# Patient Record
Sex: MALE | Race: WHITE | NOT HISPANIC OR LATINO | Employment: FULL TIME | URBAN - METROPOLITAN AREA
[De-identification: names, ages, dates, MRNs, and addresses within clinical notes are randomized per-mention and may not be internally consistent; named-entity substitution may affect disease eponyms.]

---

## 2023-12-13 ENCOUNTER — OFFICE VISIT (OUTPATIENT)
Dept: URGENT CARE | Facility: CLINIC | Age: 53
End: 2023-12-13
Payer: COMMERCIAL

## 2023-12-13 VITALS
SYSTOLIC BLOOD PRESSURE: 114 MMHG | RESPIRATION RATE: 18 BRPM | TEMPERATURE: 97.8 F | WEIGHT: 190 LBS | HEART RATE: 71 BPM | OXYGEN SATURATION: 98 % | DIASTOLIC BLOOD PRESSURE: 72 MMHG

## 2023-12-13 DIAGNOSIS — Z76.89 REFERRAL OF PATIENT: ICD-10-CM

## 2023-12-13 DIAGNOSIS — M54.50 ACUTE BILATERAL LOW BACK PAIN, UNSPECIFIED WHETHER SCIATICA PRESENT: ICD-10-CM

## 2023-12-13 DIAGNOSIS — R19.00 REDUCIBLE BULGE OF ABDOMINAL WALL: ICD-10-CM

## 2023-12-13 PROCEDURE — 3078F DIAST BP <80 MM HG: CPT | Performed by: STUDENT IN AN ORGANIZED HEALTH CARE EDUCATION/TRAINING PROGRAM

## 2023-12-13 PROCEDURE — 99204 OFFICE O/P NEW MOD 45 MIN: CPT | Performed by: STUDENT IN AN ORGANIZED HEALTH CARE EDUCATION/TRAINING PROGRAM

## 2023-12-13 PROCEDURE — 3074F SYST BP LT 130 MM HG: CPT | Performed by: STUDENT IN AN ORGANIZED HEALTH CARE EDUCATION/TRAINING PROGRAM

## 2023-12-13 RX ORDER — KETOROLAC TROMETHAMINE 30 MG/ML
30 INJECTION, SOLUTION INTRAMUSCULAR; INTRAVENOUS ONCE
Status: COMPLETED | OUTPATIENT
Start: 2023-12-13 | End: 2023-12-13

## 2023-12-13 RX ORDER — LIDOCAINE 50 MG/G
1 PATCH TOPICAL EVERY 24 HOURS
Qty: 10 PATCH | Refills: 0 | Status: SHIPPED | OUTPATIENT
Start: 2023-12-13 | End: 2024-01-29

## 2023-12-13 RX ADMIN — KETOROLAC TROMETHAMINE 30 MG: 30 INJECTION, SOLUTION INTRAMUSCULAR; INTRAVENOUS at 13:09

## 2023-12-13 NOTE — PROGRESS NOTES
Subjective     Samuel Mcgowan is a 53 y.o. male who presents with Back Injury (Lower back injury when lifting weights, x last Saturday. Concerned he also has a hernia )            Samuel is a 53 y.o. male who presents with bilateral lower back pain.  Patient states he injured lower back while lifting weights over the weekend. Patient believed he hurt it while doing deadlifts.  Patient has been resting back applying heat and taking OTC pain reliever for symptoms which has helped.  Patient states the other day he was trying to tighten his core to support his back and noticed a bulge in his abdomen.  Patient states he pushed and bulging portion of diarrhea..  Patient reports no abdominal pain.  No bowel/bladder incontinence.  No radiation of back pain or numbness/tingling.  Patient is concerned for hernia ever since he saw the bulge.  Reports daily BMs.    Back Pain  This is a new problem. The current episode started in the past 7 days. The problem is unchanged. The pain is present in the lumbar spine. The pain does not radiate. Pertinent negatives include no abdominal pain, bladder incontinence, bowel incontinence, chest pain, dysuria, fever, headaches, leg pain, numbness, paresis, paresthesias, pelvic pain, perianal numbness, tingling or weakness. He has tried heat and analgesics for the symptoms. The treatment provided mild relief.       Review of Systems   Constitutional:  Negative for fever.   Cardiovascular:  Negative for chest pain.   Gastrointestinal:  Negative for abdominal pain and bowel incontinence.   Genitourinary:  Negative for bladder incontinence, dysuria and pelvic pain.   Musculoskeletal:  Positive for back pain.   Neurological:  Negative for tingling, weakness, numbness, headaches and paresthesias.              Objective     /72   Pulse 71   Temp 36.6 °C (97.8 °F)   Resp 18   SpO2 98%      Physical Exam  Constitutional:       Appearance: Normal appearance.   HENT:      Head:  Normocephalic and atraumatic.      Nose: Nose normal.      Mouth/Throat:      Mouth: Mucous membranes are moist.      Pharynx: Oropharynx is clear.   Eyes:      Extraocular Movements: Extraocular movements intact.      Conjunctiva/sclera: Conjunctivae normal.      Pupils: Pupils are equal, round, and reactive to light.   Cardiovascular:      Rate and Rhythm: Normal rate.   Pulmonary:      Effort: Pulmonary effort is normal.   Abdominal:      General: Abdomen is flat. Bowel sounds are normal. There is no distension.      Palpations: Abdomen is soft.      Tenderness: There is no abdominal tenderness. There is no guarding or rebound.      Hernia: No hernia is present.   Musculoskeletal:      Cervical back: Normal.      Thoracic back: Normal.      Lumbar back: Tenderness present. No swelling, deformity, spasms or bony tenderness. Normal range of motion.        Back:    Skin:     General: Skin is warm and dry.   Neurological:      General: No focal deficit present.      Mental Status: He is alert. Mental status is at baseline.      Gait: Gait is intact.                             Assessment & Plan        1. Acute bilateral low back pain, unspecified whether sciatica present  - ketorolac (Toradol) injection 30 mg  - diclofenac sodium (VOLTAREN) 1 % Gel; Apply 2 g topically 2 times a day as needed (pain) for up to 14 days.  Dispense: 100 g; Refill: 0  - lidocaine (LIDODERM) 5 % Patch; Place 1 Patch on the skin every 24 hours.  Dispense: 10 Patch; Refill: 0    2. Reducible bulge of abdominal wall  - Undiagnosed new problem.  - No hernia appreciated on physical exam. Patient reports abdominal bulge which he pressed in. Normal daily BMs. No abdominal pain.  - US-HERNIA ABDOMEN; Future  -Scheduled as outpatient.  Will contact patient with results once ultrasound completed.  Ultrasound results will also be available via [employer].    US-HERNIA ABDOMEN:  FINDINGS:Focus ultrasound of the area of the in the right abdominal wall  demonstrates no hernia, mass or fluid collection.  IMPRESSION: No abnormalities detected.    Differential diagnoses, supportive care measures and indications for immediate follow-up discussed with patient. Pathogenesis of diagnosis discussed including typical length and natural progression.  Follow up with PCP. ER precautions discussed.    Instructed to return to urgent care or nearest emergency department if symptoms fail to improve, for any change in condition, further concerns, or new concerning symptoms.    Patient states understanding and agrees with the plan of care and discharge instructions.

## 2023-12-14 ENCOUNTER — HOSPITAL ENCOUNTER (OUTPATIENT)
Dept: RADIOLOGY | Facility: MEDICAL CENTER | Age: 53
End: 2023-12-14
Attending: STUDENT IN AN ORGANIZED HEALTH CARE EDUCATION/TRAINING PROGRAM
Payer: COMMERCIAL

## 2023-12-14 ENCOUNTER — TELEPHONE (OUTPATIENT)
Dept: URGENT CARE | Facility: PHYSICIAN GROUP | Age: 53
End: 2023-12-14

## 2023-12-14 DIAGNOSIS — R19.00 REDUCIBLE BULGE OF ABDOMINAL WALL: ICD-10-CM

## 2023-12-14 PROCEDURE — 76705 ECHO EXAM OF ABDOMEN: CPT

## 2023-12-14 ASSESSMENT — ENCOUNTER SYMPTOMS
PARESTHESIAS: 0
ABDOMINAL PAIN: 0
TINGLING: 0
HEADACHES: 0
PERIANAL NUMBNESS: 0
BOWEL INCONTINENCE: 0
PARESIS: 0
FEVER: 0
WEAKNESS: 0
NUMBNESS: 0
BACK PAIN: 1
LEG PAIN: 0

## 2023-12-15 NOTE — TELEPHONE ENCOUNTER
"Phone number on file states \"not in service.\"  Calling in regards to ultrasound result.    US-HERNIA ABDOMEN   FINDINGS: Focus ultrasound of the area of the in the right abdominal wall demonstrates no hernia, mass or fluid collection.  IMPRESSION: No abnormalities detected.    Follow up with PCP.    Return to urgent care or nearest emergency department if symptoms fail to improve, for any change in condition, further concerns, or new concerning symptoms.    "

## 2023-12-17 ENCOUNTER — HOSPITAL ENCOUNTER (EMERGENCY)
Facility: MEDICAL CENTER | Age: 53
End: 2023-12-17
Attending: EMERGENCY MEDICINE
Payer: COMMERCIAL

## 2023-12-17 VITALS
RESPIRATION RATE: 16 BRPM | TEMPERATURE: 98 F | HEART RATE: 50 BPM | BODY MASS INDEX: 25.39 KG/M2 | DIASTOLIC BLOOD PRESSURE: 65 MMHG | WEIGHT: 191.58 LBS | SYSTOLIC BLOOD PRESSURE: 114 MMHG | OXYGEN SATURATION: 96 % | HEIGHT: 73 IN

## 2023-12-17 DIAGNOSIS — R51.9 ACUTE NONINTRACTABLE HEADACHE, UNSPECIFIED HEADACHE TYPE: ICD-10-CM

## 2023-12-17 LAB
ANION GAP SERPL CALC-SCNC: 11 MMOL/L (ref 7–16)
BASOPHILS # BLD AUTO: 0.6 % (ref 0–1.8)
BASOPHILS # BLD: 0.04 K/UL (ref 0–0.12)
BUN SERPL-MCNC: 11 MG/DL (ref 8–22)
CALCIUM SERPL-MCNC: 9.4 MG/DL (ref 8.5–10.5)
CHLORIDE SERPL-SCNC: 99 MMOL/L (ref 96–112)
CO2 SERPL-SCNC: 26 MMOL/L (ref 20–33)
CREAT SERPL-MCNC: 1.15 MG/DL (ref 0.5–1.4)
EOSINOPHIL # BLD AUTO: 0.11 K/UL (ref 0–0.51)
EOSINOPHIL NFR BLD: 1.6 % (ref 0–6.9)
ERYTHROCYTE [DISTWIDTH] IN BLOOD BY AUTOMATED COUNT: 38.7 FL (ref 35.9–50)
GFR SERPLBLD CREATININE-BSD FMLA CKD-EPI: 75 ML/MIN/1.73 M 2
GLUCOSE SERPL-MCNC: 92 MG/DL (ref 65–99)
HCT VFR BLD AUTO: 45.7 % (ref 42–52)
HGB BLD-MCNC: 15.7 G/DL (ref 14–18)
IMM GRANULOCYTES # BLD AUTO: 0.02 K/UL (ref 0–0.11)
IMM GRANULOCYTES NFR BLD AUTO: 0.3 % (ref 0–0.9)
LYMPHOCYTES # BLD AUTO: 1.46 K/UL (ref 1–4.8)
LYMPHOCYTES NFR BLD: 21 % (ref 22–41)
MCH RBC QN AUTO: 30.7 PG (ref 27–33)
MCHC RBC AUTO-ENTMCNC: 34.4 G/DL (ref 32.3–36.5)
MCV RBC AUTO: 89.4 FL (ref 81.4–97.8)
MONOCYTES # BLD AUTO: 0.61 K/UL (ref 0–0.85)
MONOCYTES NFR BLD AUTO: 8.8 % (ref 0–13.4)
NEUTROPHILS # BLD AUTO: 4.7 K/UL (ref 1.82–7.42)
NEUTROPHILS NFR BLD: 67.7 % (ref 44–72)
NRBC # BLD AUTO: 0 K/UL
NRBC BLD-RTO: 0 /100 WBC (ref 0–0.2)
PLATELET # BLD AUTO: 231 K/UL (ref 164–446)
PMV BLD AUTO: 9.7 FL (ref 9–12.9)
POTASSIUM SERPL-SCNC: 4 MMOL/L (ref 3.6–5.5)
RBC # BLD AUTO: 5.11 M/UL (ref 4.7–6.1)
SODIUM SERPL-SCNC: 136 MMOL/L (ref 135–145)
WBC # BLD AUTO: 6.9 K/UL (ref 4.8–10.8)

## 2023-12-17 PROCEDURE — 96375 TX/PRO/DX INJ NEW DRUG ADDON: CPT

## 2023-12-17 PROCEDURE — 96374 THER/PROPH/DIAG INJ IV PUSH: CPT

## 2023-12-17 PROCEDURE — 700111 HCHG RX REV CODE 636 W/ 250 OVERRIDE (IP): Performed by: EMERGENCY MEDICINE

## 2023-12-17 PROCEDURE — 85025 COMPLETE CBC W/AUTO DIFF WBC: CPT

## 2023-12-17 PROCEDURE — 99284 EMERGENCY DEPT VISIT MOD MDM: CPT

## 2023-12-17 PROCEDURE — 36415 COLL VENOUS BLD VENIPUNCTURE: CPT

## 2023-12-17 PROCEDURE — 80048 BASIC METABOLIC PNL TOTAL CA: CPT

## 2023-12-17 RX ORDER — DEXAMETHASONE SODIUM PHOSPHATE 4 MG/ML
10 INJECTION, SOLUTION INTRA-ARTICULAR; INTRALESIONAL; INTRAMUSCULAR; INTRAVENOUS; SOFT TISSUE ONCE
Status: COMPLETED | OUTPATIENT
Start: 2023-12-17 | End: 2023-12-17

## 2023-12-17 RX ORDER — KETOROLAC TROMETHAMINE 30 MG/ML
15 INJECTION, SOLUTION INTRAMUSCULAR; INTRAVENOUS ONCE
Status: COMPLETED | OUTPATIENT
Start: 2023-12-17 | End: 2023-12-17

## 2023-12-17 RX ORDER — DIPHENHYDRAMINE HYDROCHLORIDE 50 MG/ML
25 INJECTION INTRAMUSCULAR; INTRAVENOUS ONCE
Status: COMPLETED | OUTPATIENT
Start: 2023-12-17 | End: 2023-12-17

## 2023-12-17 RX ORDER — METOCLOPRAMIDE HYDROCHLORIDE 5 MG/ML
10 INJECTION INTRAMUSCULAR; INTRAVENOUS ONCE
Status: COMPLETED | OUTPATIENT
Start: 2023-12-17 | End: 2023-12-17

## 2023-12-17 RX ADMIN — DEXAMETHASONE SODIUM PHOSPHATE 10 MG: 4 INJECTION INTRA-ARTICULAR; INTRALESIONAL; INTRAMUSCULAR; INTRAVENOUS; SOFT TISSUE at 20:27

## 2023-12-17 RX ADMIN — METOCLOPRAMIDE 10 MG: 5 INJECTION, SOLUTION INTRAMUSCULAR; INTRAVENOUS at 20:23

## 2023-12-17 RX ADMIN — DIPHENHYDRAMINE HYDROCHLORIDE 25 MG: 50 INJECTION, SOLUTION INTRAMUSCULAR; INTRAVENOUS at 20:23

## 2023-12-17 RX ADMIN — KETOROLAC TROMETHAMINE 15 MG: 30 INJECTION, SOLUTION INTRAMUSCULAR; INTRAVENOUS at 20:22

## 2023-12-17 ASSESSMENT — PAIN DESCRIPTION - PAIN TYPE: TYPE: ACUTE PAIN

## 2023-12-18 NOTE — DISCHARGE INSTRUCTIONS
You were seen in the Emergency Department for headache.    Labs were completed without significant acute abnormalities.    Please use 1,000mg of tylenol or 600mg of ibuprofen every 6 hours as needed for pain.    Please follow up with your primary care physician.    Return to the Emergency Department with severe headaches, confusion, new neurologic deficits, or other concerns.

## 2023-12-18 NOTE — ED NOTES
"Pt discharged to home with steady gait.  Pt alert and oriented times 4 on room air.  IV discontinued and gauze placed, pt in possession of belongings.  Pt provided discharge education and information pertaining to medications and follow up appointments.  Pt received copy of discharge instructions and verbalized understanding. Encouraged to follow up with PCP. /65   Pulse (!) 50   Temp 36.7 °C (98 °F) (Temporal)   Resp 16   Ht 1.854 m (6' 1\")   Wt 86.9 kg (191 lb 9.3 oz)   SpO2 96%   BMI 25.28 kg/m²      "

## 2023-12-18 NOTE — ED PROVIDER NOTES
"ED Provider Note    CHIEF COMPLAINT  Chief Complaint   Patient presents with    Migraine     Patient reports 30 hours. Patient reports light sensitive, vomiting, distorted vision \" black spots\". Patient reports hx of migraine but has not had one in 10 years. Patient reports taking 2 Advil unknown strength 1-2 hours ago.      N/V     EXTERNAL RECORDS REVIEWED  Patient was seen at Urgent Care on 12/13 for back pain. He was last admitted to outside hospital in 2021 for chest pain.     HPI/ROS  LIMITATION TO HISTORY   None  OUTSIDE HISTORIAN(S):  Significant other Wife is at bedside.    Samuel Mcgowan is a 53 y.o. male who presents to the Emergency Department with headache, nausea, and vomiting onset 2 days ago. Wife states associated symptoms of vision changes, nausea, vomiting, and headache. He describes his vision changes as prism like changes which he often times gets without headaches as well. He describes his headache is was on one side yesterday and moved to the other side that worsens in well lit areas.  No recent illness or fevers.  No recent head trauma. He states a history of migraines but has not had one in many years.  He reports he took 2 Advils 1-2 hours ago with minimal relief. He states he has not seen a neurologist in years, because he was not experiencing migraines anymore. He notes he is allergic to phenergan which causes him to become very agitated and \"freak out.\" He adds he is overall healthy and does not take any daily medications.    PAST MEDICAL HISTORY  History reviewed. No pertinent past medical history.     SURGICAL HISTORY  History reviewed. No pertinent surgical history.     FAMILY HISTORY  History reviewed. No pertinent family history.    SOCIAL HISTORY   reports that he has never smoked. He has never used smokeless tobacco. He reports current alcohol use. He reports current drug use. Drugs: Inhaled and Oral.    CURRENT MEDICATIONS  Discharge Medication List as of 12/17/2023 10:07 " "PM        CONTINUE these medications which have NOT CHANGED    Details   diclofenac sodium (VOLTAREN) 1 % Gel Apply 2 g topically 2 times a day as needed (pain) for up to 14 days., Disp-100 g, R-0, Normal      lidocaine (LIDODERM) 5 % Patch Place 1 Patch on the skin every 24 hours., Disp-10 Patch, R-0, Normal             ALLERGIES  Promethazine and Penicillins    PHYSICAL EXAM  /74   Pulse (!) 54   Temp 36.8 °C (98.2 °F) (Temporal)   Resp 16   Ht 1.854 m (6' 1\")   Wt 86.9 kg (191 lb 9.3 oz)   SpO2 100%      Constitutional: Nontoxic appearing. Alert in no apparent distress.  HENT: Normocephalic, Atraumatic. Bilateral external ears normal. Nose normal.  Moist mucous membranes.  Oropharynx clear.  Eyes: Pupils are equal and reactive. Conjunctiva normal.   Neck: Supple, full range of motion  Heart: Regular rate and rhythm.  No murmurs.    Lungs: No respiratory distress, normal work of breathing. Lungs clear to auscultation bilaterally.  Abdomen Soft, no distention.  No tenderness to palpation.  Musculoskeletal: Atraumatic. No obvious deformities noted.  No lower extremity edema.  Skin: Warm, Dry.  No erythema, No rash.   Neurologic: Alert and oriented x3. Moving all extremities spontaneously without focal deficits. Alert and oriented x3.  Cranial nerves II-XII intact.  Strength 5/5 and sensation intact throughout all 4 extremities.  No pronator drift.  No dysmetria.  Normal speech. Normal gait.   Psychiatric: Affect normal, Mood normal, Appears appropriate and not intoxicated.     DIAGNOSTIC STUDIES / PROCEDURES      LABS  Labs Reviewed   CBC WITH DIFFERENTIAL - Abnormal; Notable for the following components:       Result Value    Lymphocytes 21.00 (*)     All other components within normal limits   BASIC METABOLIC PANEL   ESTIMATED GFR          COURSE & MEDICAL DECISION MAKING  7:50 PM - Patient seen and examined at bedside. Discussed plan of care, including labs and medication to treat his headache. The " patient will be medicated with Toradol injection 15 mg. Ordered for BMP and CBC w/ Diff to evaluate his symptoms. Patient agrees to the plan of care.    ED Observation Status? No; Patient does not meet criteria for ED Observation.     INITIAL ASSESSMENT, COURSE AND PLAN  Care Narrative: Relatively healthy patient with significant migraine history who presents with 2 day history of headache.  He has normal vitals on arrival.  No neurologic deficits on exam to suggest intracranial pathology.  Clinically doubt meningitis or subarachnoid hemorrhage.  Labs are reassuring without leukocytosis, electrolyte abnormality or renal dysfunction.  Will treat symptomatically for likely migraine followed by reassessment.    9:20PM - Upon reassessment, patient is resting comfortably with normal vital signs. No new complaints at this time. Headache significantly improved.  Discussed results with patient and/or family as well as importance of primary care follow up. Patient understands plan of care and strict return precautions for new or changing symptoms.     ADDITIONAL PROBLEM LIST  Problem #1: Acute headache - likely migraine, improved with treatment      DISPOSITION AND DISCUSSIONS  Escalation of care considered, and ultimately not performed:diagnostic imaging    Barriers to care at this time, including but not limited to: Patient does not have established PCP.     Decision tools and prescription drugs considered including, but not limited to: Pain Medications over the counter medications should be sufficient .      DISPOSITION:  Patient will be discharged home in stable condition.    FOLLOW UP:  [facility] - Primary Care  [address]  [facility] [id number] [phone]  Call   to establish primary care physician    [facility], Emergency Dept  [address] [facility] [address] [phone]    If symptoms worsen      OUTPATIENT MEDICATIONS:  Discharge Medication List as of 12/17/2023 10:07  PM             FINAL DIAGNOSIS  1. Acute nonintractable headache, unspecified headache type        The note accurately reflects work and decisions made by me.  Lula Lomas M.D.  12/17/2023  10:47 PM    Monique PENNY (Francisco Jkusum), am scribing for, and in the presence of, Lula Lomas M.D..    Electronically signed by: Monique Zavala (Bebeto), 12/17/2023    Lula PENNY M.D. personally performed the services described in this documentation, as scribed by Monique Zavala in my presence, and it is both accurate and complete.

## 2023-12-18 NOTE — ED TRIAGE NOTES
"Chief Complaint   Patient presents with    Migraine     Patient reports 30 hours. Patient reports light sensitive, vomiting, distorted vision \" black spots\". Patient reports hx of migraine but has not had one in 10 years. Patient reports taking 2 Advil unknown strength 1-2 hours ago.      N/V       "

## 2023-12-18 NOTE — ED NOTES
Pt to G26. Pt is A&Ox4 and awake in bed. Pt placed on pulse ox, and automatic BP. VSS, saturating above 95% on RA, heart rate in the 50s. Call light within reach and chart up for ERP.

## 2023-12-18 NOTE — ED NOTES
PT AMBULATED SELF TO ROOM WITHOUT INCIDENT BY ESCORT OF THIS TECH.     PT PLACED ON MONITOR AND RESTING COMFORTABLY WITH LIGHTS OFF PER REQUEST DUE TO LIGHT SENSITIVITY. PT VISITOR AT BEDSIDE.

## 2023-12-21 ENCOUNTER — TELEPHONE (OUTPATIENT)
Dept: HEALTH INFORMATION MANAGEMENT | Facility: OTHER | Age: 53
End: 2023-12-21
Payer: COMMERCIAL

## 2024-01-13 ENCOUNTER — HOSPITAL ENCOUNTER (OUTPATIENT)
Dept: RADIOLOGY | Facility: MEDICAL CENTER | Age: 54
End: 2024-01-13
Attending: FAMILY MEDICINE
Payer: COMMERCIAL

## 2024-01-13 ENCOUNTER — OFFICE VISIT (OUTPATIENT)
Dept: URGENT CARE | Facility: PHYSICIAN GROUP | Age: 54
End: 2024-01-13
Payer: COMMERCIAL

## 2024-01-13 VITALS
SYSTOLIC BLOOD PRESSURE: 132 MMHG | RESPIRATION RATE: 16 BRPM | HEIGHT: 73 IN | DIASTOLIC BLOOD PRESSURE: 80 MMHG | WEIGHT: 184 LBS | BODY MASS INDEX: 24.39 KG/M2 | TEMPERATURE: 97.4 F | HEART RATE: 61 BPM | OXYGEN SATURATION: 97 %

## 2024-01-13 DIAGNOSIS — S66.912A STRAIN OF BOTH WRISTS, INITIAL ENCOUNTER: ICD-10-CM

## 2024-01-13 DIAGNOSIS — S66.911A STRAIN OF BOTH WRISTS, INITIAL ENCOUNTER: ICD-10-CM

## 2024-01-13 DIAGNOSIS — R51.9 NONINTRACTABLE HEADACHE, UNSPECIFIED CHRONICITY PATTERN, UNSPECIFIED HEADACHE TYPE: ICD-10-CM
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
## 2024-01-13 PROCEDURE — 3075F SYST BP GE 130 - 139MM HG: CPT | Performed by: FAMILY MEDICINE

## 2024-01-13 PROCEDURE — 3079F DIAST BP 80-89 MM HG: CPT | Performed by: FAMILY MEDICINE

## 2024-01-13 PROCEDURE — 73110 X-RAY EXAM OF WRIST: CPT | Mod: LT

## 2024-01-13 PROCEDURE — 99214 OFFICE O/P EST MOD 30 MIN: CPT | Performed by: FAMILY MEDICINE

## 2024-01-13 PROCEDURE — 73110 X-RAY EXAM OF WRIST: CPT | Mod: RT

## 2024-01-13 RX ORDER — DIPHENHYDRAMINE HYDROCHLORIDE 50 MG/ML
25 INJECTION INTRAMUSCULAR; INTRAVENOUS ONCE
Status: SHIPPED | OUTPATIENT
Start: 2024-01-13 | End: 2024-01-16

## 2024-01-13 RX ORDER — BUTALBITAL, ACETAMINOPHEN, CAFFEINE AND CODEINE PHOSPHATE 50; 325; 40; 30 MG/1; MG/1; MG/1; MG/1
1-2 CAPSULE ORAL EVERY 8 HOURS PRN
Qty: 20 CAPSULE | Refills: 0 | Status: SHIPPED | OUTPATIENT
Start: 2024-01-13 | End: 2024-01-20

## 2024-01-13 RX ORDER — KETOROLAC TROMETHAMINE 30 MG/ML
30 INJECTION, SOLUTION INTRAMUSCULAR; INTRAVENOUS ONCE
Status: COMPLETED | OUTPATIENT
Start: 2024-01-13 | End: 2024-01-13

## 2024-01-13 RX ADMIN — KETOROLAC TROMETHAMINE 30 MG: 30 INJECTION, SOLUTION INTRAMUSCULAR; INTRAVENOUS at 15:46

## 2024-01-13 ASSESSMENT — ENCOUNTER SYMPTOMS
HEADACHES: 1
FALLS: 1

## 2024-01-13 NOTE — PROGRESS NOTES
Subjective     Samuel Mcgowan is a 53 y.o. male who presents with Wrist Injury (Today exercising and fell back and landed on his wrists, migraine)    - This is a very pleasant 53 y.o. who has come to the walk-in clinic today for wrist injuries.  A few hours ago was jumping up on some blocks for exercise and the block kicked out from underneath him and he fell backwards onto his wrists.  Since then has had some pain in the wrist areas right more so than the left.  Denies hitting his head or any loss of consciousness.    Also reports history of migraines and they have been acting up in the past month or 2.  On his way here he feels his typical migraine starting which is usually some like spots in his visual field and a little bit of nausea and some light sensitivity and then this typically fades away and a headache develops.  Used to be on Imitrex in the past.  Excellent strong work helps migraines this and is like a combo like Toradol and Benadryl help. Denies focal limb weakness/numbness heaviness           ALLERGIES:  Promethazine and Penicillins     PMH:  History reviewed. No pertinent past medical history.     PSH:  History reviewed. No pertinent surgical history.    MEDS:    Current Outpatient Medications:     lidocaine (LIDODERM) 5 % Patch, Place 1 Patch on the skin every 24 hours. (Patient not taking: Reported on 1/13/2024), Disp: 10 Patch, Rfl: 0    Current Facility-Administered Medications:     diphenhydrAMINE (Benadryl) injection 25 mg, 25 mg, Intramuscular, Once, Vincent Navarro M.D.    ** I have documented what I find to be significant in regards to past medical, social, family and surgical history  in my HPI or under PMH/PSH/ review section, otherwise it is noncontributory **           HPI    Review of Systems   Musculoskeletal:  Positive for falls and joint pain.   Neurological:  Positive for headaches.   All other systems reviewed and are negative.             Objective     /80    "Pulse 61   Temp 36.3 °C (97.4 °F) (Temporal)   Resp 16   Ht 1.854 m (6' 1\")   Wt 83.5 kg (184 lb)   SpO2 97%   BMI 24.28 kg/m²      Physical Exam  Vitals and nursing note reviewed.   Constitutional:       General: He is not in acute distress.     Appearance: Normal appearance. He is well-developed.   HENT:      Head: Normocephalic.   Eyes:      Extraocular Movements: Extraocular movements intact.      Pupils: Pupils are equal, round, and reactive to light.   Cardiovascular:      Heart sounds: Normal heart sounds. No murmur heard.  Pulmonary:      Effort: Pulmonary effort is normal. No respiratory distress.   Musculoskeletal:        Arms:       Cervical back: Normal range of motion and neck supple.      Comments: Wrists: No wounds, discoloration, deformity, edema. Good SROM. Some TTP and pain w/ ROM  R>L. NVI    Neurological:      General: No focal deficit present.      Mental Status: He is alert and oriented to person, place, and time.      Cranial Nerves: No cranial nerve deficit.      Motor: No abnormal muscle tone.   Psychiatric:         Mood and Affect: Mood normal.         Behavior: Behavior normal.                             Assessment & Plan     1. Ground-level fall  DX-WRIST-COMPLETE 3+ RIGHT    DX-WRIST-COMPLETE 3+ LEFT      2. Strain of both wrists, initial encounter  DX-WRIST-COMPLETE 3+ RIGHT    DX-WRIST-COMPLETE 3+ LEFT      3. Headache  ketorolac (Toradol) injection 30 mg    diphenhydrAMINE (Benadryl) injection 25 mg          - Dx, plan & d/c instructions discussed   - Rest/ice  - OTC Motrin and/or Tylenol as needed  - thumb spica splint x 1 week   - E.R. precautions discussed     Follow up with your regular primary care providers office within a week to keep them updated and informed of this visit and for regular routine health maintenance check-ups. ER if not improving in 2-3 days or if feeling/getting worse. (If you do not have a primary care provider and need to schedule one you may call " [facility] at [phone] to do this).    Any realistic side effects of medications that may have been given today reviewed.     Patient left in stable condition     Today's radiology imaging personally reviewed by me today on day of visit and Radiology readings reviewed and discussed w/ patient today.      reviewed if narcotics given     Pertinent prior lab work and/or imaging studies in Epic have been reviewed by me today on day of this visit and taken into account for my treatment and plan today    Pertinent PMH/PSH and/or chronic conditions and medications if any were reviewed today and taken into account for my treatment and plan today    Pertinent prior office visit notes in [facility] have been reviewed by me today on day of this visit.    Please note that this dictation may have been created using voice recognition software, if so I have made every reasonable attempt to correct obvious errors, but I expect that there are errors of grammar and possibly content that I did not discover before finalizing the note.

## 2024-01-22 ENCOUNTER — TELEPHONE (OUTPATIENT)
Dept: HEALTH INFORMATION MANAGEMENT | Facility: OTHER | Age: 54
End: 2024-01-22
Payer: COMMERCIAL

## 2024-01-29 ENCOUNTER — OFFICE VISIT (OUTPATIENT)
Dept: NEUROLOGY | Facility: MEDICAL CENTER | Age: 54
End: 2024-01-29
Attending: PSYCHIATRY & NEUROLOGY
Payer: COMMERCIAL

## 2024-01-29 ENCOUNTER — PHARMACY VISIT (OUTPATIENT)
Dept: PHARMACY | Facility: MEDICAL CENTER | Age: 54
End: 2024-01-29
Payer: COMMERCIAL

## 2024-01-29 VITALS
OXYGEN SATURATION: 100 % | HEIGHT: 73 IN | BODY MASS INDEX: 24.6 KG/M2 | HEART RATE: 60 BPM | SYSTOLIC BLOOD PRESSURE: 116 MMHG | WEIGHT: 185.63 LBS | TEMPERATURE: 98.1 F | DIASTOLIC BLOOD PRESSURE: 76 MMHG

## 2024-01-29 DIAGNOSIS — G43.109 MIGRAINE WITH AURA AND WITHOUT STATUS MIGRAINOSUS, NOT INTRACTABLE: Primary | ICD-10-CM

## 2024-01-29 PROCEDURE — 3074F SYST BP LT 130 MM HG: CPT | Performed by: PSYCHIATRY & NEUROLOGY

## 2024-01-29 PROCEDURE — 3078F DIAST BP <80 MM HG: CPT | Performed by: PSYCHIATRY & NEUROLOGY

## 2024-01-29 PROCEDURE — RXMED WILLOW AMBULATORY MEDICATION CHARGE: Performed by: PSYCHIATRY & NEUROLOGY

## 2024-01-29 PROCEDURE — 99211 OFF/OP EST MAY X REQ PHY/QHP: CPT | Performed by: PSYCHIATRY & NEUROLOGY

## 2024-01-29 PROCEDURE — 99205 OFFICE O/P NEW HI 60 MIN: CPT | Performed by: PSYCHIATRY & NEUROLOGY

## 2024-01-29 RX ORDER — SUMATRIPTAN 20 MG/1
SPRAY NASAL
Qty: 9 EACH | Refills: 4 | Status: SHIPPED | OUTPATIENT
Start: 2024-01-29

## 2024-01-29 RX ORDER — RIMEGEPANT SULFATE 75 MG/75MG
1 TABLET, ORALLY DISINTEGRATING ORAL
Qty: 6 TABLET | Refills: 0 | Status: SHIPPED | OUTPATIENT
Start: 2024-01-29 | End: 2024-02-01

## 2024-01-29 RX ORDER — RIMEGEPANT SULFATE 75 MG/75MG
1 TABLET, ORALLY DISINTEGRATING ORAL
Qty: 15 TABLET | Refills: 5 | Status: SHIPPED | OUTPATIENT
Start: 2024-01-29 | End: 2024-02-01

## 2024-01-29 ASSESSMENT — ENCOUNTER SYMPTOMS
HEARTBURN: 0
HEADACHES: 1
MEMORY LOSS: 0
INSOMNIA: 0

## 2024-01-29 ASSESSMENT — PATIENT HEALTH QUESTIONNAIRE - PHQ9: CLINICAL INTERPRETATION OF PHQ2 SCORE: 0

## 2024-01-30 ENCOUNTER — TELEPHONE (OUTPATIENT)
Dept: NEUROLOGY | Facility: MEDICAL CENTER | Age: 54
End: 2024-01-30
Payer: COMMERCIAL

## 2024-01-30 NOTE — TELEPHONE ENCOUNTER
Prior Authorization for NURTEC 75 MG TBDP (Quantity: 15, Days: 30) has been submitted via Cover My Meds: Key ([id number] - PA Case ID: [id number])    Insurance: [facility] ([employer])    Will follow up in 24-48 business hours.

## 2024-01-30 NOTE — TELEPHONE ENCOUNTER
Received [employer] PA request via [employer]  for NURTEC 75 MG TBDP. (Quantity:15, Day Supply:30)     Insurance: [facility] ([employer])  Member ID:  [id number]  BIN: [id number]  PCN: [id number]  Group: [facility]     Ran Test claim via [place] & medication Rejects stating prior authorization is required.

## 2024-01-30 NOTE — PROGRESS NOTES
"Subjective     Samuel Mcgowan is a 53 y.o. male who presents from the office of Dr. Justin Navarro MD, for consultation, with a history of migraine with aura.     CARMEN Baker is a very pleasant 53-year-old right-handed gentleman who has been suffering from migraine with aura following head trauma several decades ago, but which have remained a recurrent problem for him.    Prodrome: None    Aura: The auras are typically visual in nature, he describes photopsias, kaleidoscopic affect, but since December of last year with his most recent headache attack, he described a \"graying out\" of vision.  Regardless, the symptoms would persist for about 30 minutes before the headache would begin.  For the first time last year, he actually had an aura attack occur without a headache that followed.    Headache: Usually over the right temple, rarely over the left, he describes a throbbing pain, heightened sensitivities, impaired concentration, etc.  As the headache begins, the visual distortions resolved, nausea and vomiting occur but only towards the end of the headache itself.  He describes neck pain or stiffness, allodynia or autonomic symptoms with the headaches.    Duration: Severe pain goes for 1 day, residual pain and malaise for another day before he is back at baseline.  Between the severe attacks he still describes malaise and sensitivities to light.    Onset: Random over the course of the day.    Frequency: He has a headache or headaches that occur in a cluster pattern lasting for about 3-6 weeks.  Between these clusters he is essentially headache free.  During a cluster he can have a severe headache upwards of 4 times in a week.  His last cluster occurred this past December, 2023, prior to that December, 2022, prior to that years prior.    Triggers: No clear triggers have been identified as to the start of a headache cluster.    Workup: Over the years he did undergo MRI imaging of the brain, these always " "proving unremarkable.  I have no reports or images to review.    Treatment: He uses Imitrex nasal spray, but unfortunately he uses this with the onset of the aura, he also inhales the medication rather than leaving it to be absorbed in the anterior nares.  He cannot tolerate NSAIDs because of severe reflux.  He has been on no other prescription medications.    Other than significant GERD and heartburn, migraine with aura, he has no history of CAD, CVA, PVD, malignancy, thyroid disease, tremor, glaucoma, kidney stones, malignancy, diabetes, hypertension, autoimmune disease, psychiatric disease, liver or kidney disease, pulmonary disease, or blood dyscrasia.    There is no surgical history of note.    In the family, his mother and sister both suffer from migraine headache, he knows little of his birth father.  His brother is alive and well.    He rarely drinks alcohol, does not smoke cigarettes.  He is an athlete, and active triathlete especially.  He is an IT software salesman, a former .  He denies any atypical stressors.  He has Imitrex nasal spray to use as needed.    Review of Systems   Constitutional:  Negative for malaise/fatigue.   Gastrointestinal:  Negative for heartburn.   Neurological:  Positive for headaches.   Psychiatric/Behavioral:  Negative for memory loss. The patient does not have insomnia.    All other systems reviewed and are negative.    Objective     /76 (BP Location: Right arm, Patient Position: Sitting, BP Cuff Size: Adult)   Pulse 60   Temp 36.7 °C (98.1 °F) (Temporal)   Ht 1.854 m (6' 1\")   Wt 84.2 kg (185 lb 10 oz)   SpO2 100%   BMI 24.49 kg/m²      Physical Exam    He appears in no acute distress.  Vital signs are stable.  There is no malar rash, jaw or temporal tenderness, jaw claudication, or allodynia.  His neck is supple, range of motion is full, carotid pulses are present bilaterally without asymmetry or bruits.  Cardiac evaluation reveals a regular rhythm.  " There is no evidence of diffuse rash, bruising, there is no lower extremity edema.     Neurological Exam    Fully oriented, there is no aphasia, agnosia, apraxia, or inattention.    PERRLA/EOMI, visual fields are full to finger counting on confrontation bilaterally.  Funduscopic exam reveals sharp disc margins bilaterally.  Facial movements are symmetric, sensory exam is intact to temperature and light touch bilaterally.  Tongue and uvula midline, there is no bulbar dysfunction.  Jaw movements are intact, jaw jerk is absent.  Shoulder shrug and head rotation are intact.    Musculoskeletal exam reveals normal tone bilaterally, there is no tremor, asterixis, or drift.  Strength is 5/5 throughout.  Reflexes are diffusely hypoactive but present at all points without asymmetries, ankle jerks require distraction in order to be elicited.  The toes are downgoing bilaterally.    He stands easily, gait is normal and station and stride length, armswing is symmetric.  Heel, toe, and tandem walking are all normal.  Repetitive movements are normal and symmetric in both amplitude and frequency throughout.  There is no appendicular dystaxia.    Sensory exam is intact to temperature and light touch, Romberg is absent.    Assessment & Plan     1. Migraine with aura and without status migrainosus, not intractable  Gentleman does suffer from migraine with aura, the diagnosis is fairly straightforward, and as such, he has a less than 0.2% chance that these are symptomatic headaches.  Imaging has already been done, I do not believe blood work and EEG studies are necessary.  He has begun to develop migrainous aura attacks without headache, it would be interesting to see into the future what happens with these.  For him the interesting factor is that he has a grouping of migraines that lasts upwards of 3-6 weeks, but during these attacks, for the entire duration, he is still to a great degree disabled or totally incapacitated.  I think he  would benefit from a mini-prophylaxis during these episodes once they start, and continuing to use rescue.  In regards to the latter, he was reeducated as to how Imitrex nasal spray should be administered and its timing, specifically as the pain first begins, not at the beginning of an aura which is self lasts upwards of 30 minutes or longer.  The rationale for this was reviewed in full.    We talked at length about the nature of migraine with aura, his specific circumstance and why treatment recommendations are being made as such.  He is comfortable with this.  For mini-prophylaxis, Nurtec ODT 75 mg will be taken every other day through the susceptible cycle, and once headaches have stopped completely, he can stop taking the drug.  Samples were provided, in addition to a larger prescription under separate cover.  He will use the Imitrex nasal spray as needed, again with first pain onset or at least 20 minutes into the aura.  He was also told a second dose can be taken within 24 hours, actually within 1-2 hours if needed.  He and I can follow-up in 6 months, we will communicate via [employer] in the interim.    - Rimegepant Sulfate (NURTEC) 75 MG TABLET DISPERSIBLE; Take 1 Tablet by mouth every 48 hours.  Dispense: 6 Tablet; Refill: 0  - Rimegepant Sulfate (NURTEC) 75 MG TABLET DISPERSIBLE; Take 1 Tablet by mouth every 48 hours.  Dispense: 15 Tablet; Refill: 5  - sumatriptan (IMITREX) 20 MG/ACT nasal spray; 1 spray at headache onset/increase, repeat in 1 hour prn  Dispense: 9 Each; Refill: 4    Time: 60 minutes in total spent on patient care including pre-charting, record review, discussion with healthcare staff and documentation.  This includes face-to-face time for exam, review, discussion, as well as counseling and coordinating care.

## 2024-01-30 NOTE — TELEPHONE ENCOUNTER
Received [employer] PA request via [employer]  for Sumatriptan (Imitrex) 20 MG/Act Nasal Spray. (Quantity:6, Day Supply:30) - Copay $40.00 Max # 6 Max DS # 30 (We CANNOT fill 90 DS) No PA req'd     Insurance: [facility] ([employer])  Member ID:  [id number]  BIN: [id number]  PCN: [id number]  Group: [facility]     Ran Test claim via [place] & medication Pays for a $40.00 copay. Will outreach to patient to offer specialty pharmacy services and or release to preferred pharmacy

## 2024-01-31 ENCOUNTER — TELEPHONE (OUTPATIENT)
Dept: NEUROLOGY | Facility: MEDICAL CENTER | Age: 54
End: 2024-01-31
Payer: COMMERCIAL

## 2024-01-31 NOTE — TELEPHONE ENCOUNTER
Prior Authorization for Nurtec 75 MG TBDP has been denied for a quantity of 15, day supply 30    Prior authorization was denied per the following: want 2 formulary alternatives tried and failed before considering Nurtec for prevention.    Prior Authorization denial reference number: [id number]  Insurance: [facility] ([employer])      Next Steps: PA has been denied by plan, they want 2 formulary alternatives tried and failed before considering Nurtec for prevention. Massaging liaison to advise provider.

## 2024-02-01 DIAGNOSIS — G43.109 MIGRAINE WITH AURA AND WITHOUT STATUS MIGRAINOSUS, NOT INTRACTABLE: ICD-10-CM

## 2024-02-01 RX ORDER — NIMODIPINE 30 MG/1
30 CAPSULE, LIQUID FILLED ORAL 2 TIMES DAILY
Qty: 60 CAPSULE | Refills: 4 | Status: SHIPPED | OUTPATIENT
Start: 2024-02-01